# Patient Record
Sex: MALE | Race: WHITE | NOT HISPANIC OR LATINO | Employment: FULL TIME | URBAN - METROPOLITAN AREA
[De-identification: names, ages, dates, MRNs, and addresses within clinical notes are randomized per-mention and may not be internally consistent; named-entity substitution may affect disease eponyms.]

---

## 2023-06-26 PROBLEM — R07.9 CHEST PAIN: Status: ACTIVE | Noted: 2023-06-26

## 2023-07-25 ENCOUNTER — TELEPHONE (OUTPATIENT)
Dept: URGENT CARE | Facility: CLINIC | Age: 63
End: 2023-07-25

## 2023-07-25 DIAGNOSIS — I10 HYPERTENSION, UNSPECIFIED TYPE: Primary | ICD-10-CM

## 2023-07-25 RX ORDER — LISINOPRIL 10 MG/1
10 TABLET ORAL
Qty: 30 TABLET | Refills: 1 | Status: SHIPPED | OUTPATIENT
Start: 2023-07-25

## 2023-07-25 NOTE — TELEPHONE ENCOUNTER
Patient's wife called. He will be out of his antihypertensive medications for approximately 45 days. Refill sent in to get him through until his appointment to establish care.

## 2023-09-11 ENCOUNTER — OFFICE VISIT (OUTPATIENT)
Dept: FAMILY MEDICINE CLINIC | Facility: CLINIC | Age: 63
End: 2023-09-11

## 2023-09-11 VITALS
DIASTOLIC BLOOD PRESSURE: 84 MMHG | RESPIRATION RATE: 20 BRPM | BODY MASS INDEX: 29.48 KG/M2 | SYSTOLIC BLOOD PRESSURE: 160 MMHG | HEIGHT: 62 IN | WEIGHT: 160.2 LBS | TEMPERATURE: 97.9 F | HEART RATE: 55 BPM | OXYGEN SATURATION: 97 %

## 2023-09-11 DIAGNOSIS — F41.9 ANXIETY: ICD-10-CM

## 2023-09-11 DIAGNOSIS — L98.9 SKIN LESIONS: ICD-10-CM

## 2023-09-11 DIAGNOSIS — Z91.09 ENVIRONMENTAL ALLERGIES: ICD-10-CM

## 2023-09-11 DIAGNOSIS — E78.00 ELEVATED LDL CHOLESTEROL LEVEL: ICD-10-CM

## 2023-09-11 DIAGNOSIS — R73.03 PREDIABETES: ICD-10-CM

## 2023-09-11 DIAGNOSIS — Z12.5 SCREENING FOR PROSTATE CANCER: ICD-10-CM

## 2023-09-11 DIAGNOSIS — Z82.49 FAMILY HISTORY OF CARDIAC DISORDER IN FATHER: ICD-10-CM

## 2023-09-11 DIAGNOSIS — R07.9 CHEST PAIN, UNSPECIFIED TYPE: Primary | ICD-10-CM

## 2023-09-11 DIAGNOSIS — I10 HYPERTENSION, UNSPECIFIED TYPE: ICD-10-CM

## 2023-09-11 DIAGNOSIS — Z12.11 SCREENING FOR COLON CANCER: ICD-10-CM

## 2023-09-11 DIAGNOSIS — Z12.2 SCREENING FOR LUNG CANCER: ICD-10-CM

## 2023-09-11 PROBLEM — K40.90 UNILATERAL INGUINAL HERNIA WITHOUT OBSTRUCTION OR GANGRENE: Status: ACTIVE | Noted: 2023-09-11

## 2023-09-11 LAB
BILIRUB BLD-MCNC: NEGATIVE MG/DL
CLARITY, POC: CLEAR
COLOR UR: YELLOW
EXPIRATION DATE: ABNORMAL
GLUCOSE UR STRIP-MCNC: NEGATIVE MG/DL
KETONES UR QL: NEGATIVE
LEUKOCYTE EST, POC: NEGATIVE
Lab: ABNORMAL
NITRITE UR-MCNC: NEGATIVE MG/ML
PH UR: 6 [PH] (ref 5–8)
PROT UR STRIP-MCNC: NEGATIVE MG/DL
RBC # UR STRIP: ABNORMAL /UL
SP GR UR: 1.02 (ref 1–1.03)
UROBILINOGEN UR QL: ABNORMAL

## 2023-09-11 PROCEDURE — 99204 OFFICE O/P NEW MOD 45 MIN: CPT

## 2023-09-11 PROCEDURE — 81003 URINALYSIS AUTO W/O SCOPE: CPT

## 2023-09-11 RX ORDER — BUSPIRONE HYDROCHLORIDE 7.5 MG/1
7.5 TABLET ORAL 2 TIMES DAILY
Qty: 60 TABLET | Refills: 0 | Status: SHIPPED | OUTPATIENT
Start: 2023-09-11

## 2023-09-11 RX ORDER — ASPIRIN 81 MG/1
TABLET ORAL
COMMUNITY
Start: 2023-08-01

## 2023-09-11 RX ORDER — LOSARTAN POTASSIUM 50 MG/1
50 TABLET ORAL DAILY
Qty: 30 TABLET | Refills: 12 | Status: SHIPPED | OUTPATIENT
Start: 2023-09-11

## 2023-09-11 NOTE — PATIENT INSTRUCTIONS
Continue current plan of care as discussed.   Take medication as ordered (if applicable).    Practice good sleep hygiene.  Eat a well balanced diet with fresh fruit and vegetables.    Drink at least 8 bottles of water or equivalent per day.     Limit sweetened beverages, sodas, juices.    Bake, boil, broil or grill your food, avoid eating fried foods.   Exercise at least 150 minutes per week.       Please work on lifestyle habits to decrease your blood pressure.     Purchase blood pressure cuff and begin checking blood pressure at home.  Omron brand works great.  Avoid wrist blood pressure cuffs - they are inaccurate.      Decrease salt intake in your diet: processed foods, frozen foods, restaurant meals.     Decrease or avoid caffeine intake.    Decrease or reduce tobacco and alcohol use (if applicable).     Avoid sea salt, onion salt, celery salt, garlic salt, table salt.     Practice good sleep hygiene.  Eat a well balanced diet with fresh fruit and vegetables.    Drink at least 8 bottles of water or equivalent per day.     Limit sweetened beverages, sodas, juices.    Bake, boil, broil or grill your food, avoid eating fried foods.   Exercise at least 150 minutes per week.

## 2023-09-11 NOTE — PROGRESS NOTES
Office Note     Name: Syed Boykin    : 1960     MRN: [id number]     Chief Complaint  Follow-up ([facility] 23 Chest Pain )    Subjective     History of Present Illness:  Syed Boykin is a 63 y.o. male who presents today for etablishment of care. He was seen at [facility] in  for chest pain. ED evaluation and discharge note below:  +++++  CBC, BMP, TSH, BNP, Mag WNL  - Hgb A1c 6.0  - Trop 10, 9  -Lipid panel   -Chest X-ray: no acute cardiopulmonary disease  - EKG reviewed and showing NSR, no st elevation  -In the ED pt given ASA  -Stress Test completed and pt is low risk for ischemia  -Telemetry  -NPO      Hypertension  -poorly Controlled       BP Readings from Last 1 Encounters:   23 152/72      - Continue lisinopril  - Monitor while admitted      I discussed the patients findings and my recommendations with patient and nursing staff.      Discharge Diagnosis:       Chest pain        Hospital Course  Patient is a 62 y.o. male presented with left sided chest pain x 4-5 weeks. Pt has no pmhx but is a smoker of cigars. Pt describes the chest pain as stabbing over left breast, no radiation, intermittent with nothing making pain better or worse. No complaints of soa, sweating or nausea. Ppt was evaluated in er and admitted for observation. Pts labs, EKG, trop, chest xray were all wnl/negative for acute disease Pt was hypertensive and started on lisinopril here in hospital. Pt vital signs are wnl. Pt had a stress test performed and showing   Left ventricular ejection fraction is hyperdynamic (Calculated EF > 70%). no evidence of ischemia.consistent with a low risk study. Findings consistent with a normal ECG stress test. Will dc home. Discussed plan with pt and pt is agreeable. Pt to start on lisinopril and follow up with pcp.    +++++    Hypertension  This is a new problem. The current episode started more than 1 month ago. The problem has been gradually improving since onset. Associated  symptoms include anxiety and chest pain. Pertinent negatives include no headaches, palpitations, shortness of breath or sweats. Past treatments include nothing. Current antihypertension treatment includes ACE inhibitors. The current treatment provides mild improvement.     The patient is here for establishment of care, management of conditions below, medication refills and preventive care.  He was previously cared for by Patient's previous physician was Dr. Griffith.    Nutrition: eating a variety of foods. Does not eat a lot of vegetables/fruits.  Self Skin Exam: occasionally  Dental care - Dentures.  Ophthalmology care [facility]. Dermatology  -Dr. Parrish (spot removed on back ten plus years ago).     Last tetanus shot was >10 years ago.     Libido:good  Healthy Habits:     Self Skin Exam: monthly  Exercise: Not currently outside of work.   He wears his seatlbelt regularly.   He sleeps approximately five to six hours per night. He does not have sleep apnea.  He does snore.  Caffeine - Soda and coffee (daily)  Tobacco - Cigars daily 2-3 black/milds  Alcohol -Rarely -socially  Marijuana-   Drugs - no    Recent Hospitalizations:  Recently treated at the following:  [facility] .    Age-appropriate Screening Schedule:  Refer to the list below for future screening recommendations based on patient's age, sex and/or medical conditions. Orders for these recommended tests are listed in the plan section. The patient has been provided with a written plan.     BMI FOLLOWUP Never done  COLORECTAL CANCER SCREENING Never done  Pneumococcal Vaccine 0-64(1 - PCV) Never done  TDAP/TD VACCINES(1 - Tdap) Never done  ZOSTER VACCINE(1 of 2) Never done  COVID-19 Vaccine(3 - Pfizer series) due on 05/25/2021  HEPATITIS C SCREENING Never done  ANNUAL PHYSICAL Never done  INFLUENZA VACCINE due on 10/01/2023    Discussion regarding plan of care for medical diagnoses listed below has been completed.   Medical history has  been reviewed.  Anticipatory guidance given and routine screenings recommended.  Genetic screening for cancers and conditions discussed if applicable.  Patient agrees with plan and will follow advice. Patient agrees to return in the near future for annual physical exam.  No Known Allergies      Current Outpatient Medications:     aspirin 81 MG EC tablet, , Disp: , Rfl:     omeprazole (priLOSEC) 40 MG capsule, Take 1 capsule by mouth Daily., Disp: , Rfl:     busPIRone (BUSPAR) 7.5 MG tablet, Take 1 tablet by mouth 2 (Two) Times a Day., Disp: 60 tablet, Rfl: 0    ibuprofen (ADVIL,MOTRIN) 400 MG tablet, Take 1 tablet by mouth Every 6 (Six) Hours As Needed for Mild Pain., Disp: , Rfl:     losartan (COZAAR) 50 MG tablet, Take 1 tablet by mouth Daily., Disp: 30 tablet, Rfl: 12    Past Medical History:   Diagnosis Date    Hypertension july 24,2023       Review of Systems   Constitutional:  Positive for unexpected weight gain (five pound weight gain.). Negative for activity change, appetite change, chills, fatigue, fever and unexpected weight loss.   HENT: Negative.          Dentures     Eyes:  Positive for visual disturbance (glasses).   Respiratory:  Positive for cough (s/p lisinopril). Negative for shortness of breath and wheezing.    Cardiovascular:  Positive for chest pain. Negative for palpitations and leg swelling.   Gastrointestinal:  Positive for GERD. Negative for abdominal distention, abdominal pain, anal bleeding, blood in stool, constipation, diarrhea, nausea and vomiting.   Endocrine: Negative for polydipsia, polyphagia and polyuria.   Genitourinary:  Positive for nocturia (2-3 times). Negative for breast discharge, decreased libido, difficulty urinating, discharge, flank pain, frequency, genital sores, hematuria, penile pain, erectile dysfunction, penile swelling, scrotal swelling and testicular pain.   Musculoskeletal:  Positive for arthralgias.   Skin: Negative.    Allergic/Immunologic: Positive for  "environmental allergies. Negative for food allergies.   Neurological:  Negative for dizziness, seizures, syncope, speech difficulty, weakness, light-headedness, headache and confusion.   Hematological:  Does not bruise/bleed easily.   Psychiatric/Behavioral:  Positive for stress. Negative for decreased concentration, self-injury, sleep disturbance, suicidal ideas and depressed mood. The patient is nervous/anxious.      Social History     Socioeconomic History    Marital status:    Tobacco Use    Smoking status: Some Days     Types: Cigars    Smokeless tobacco: Never   Vaping Use    Vaping Use: Never used   Substance and Sexual Activity    Alcohol use: Not Currently     Comment: rarely drinks-only socially    Drug use: Not Currently    Sexual activity: Yes     Partners: Female       Family History   Problem Relation Age of Onset    Cancer Mother     COPD Mother     Heart disease Father            9/11/2023     9:07 AM   PHQ-2/PHQ-9 Depression Screening   Little Interest or Pleasure in Doing Things 0-->not at all   Feeling Down, Depressed or Hopeless 0-->not at all   PHQ-9: Brief Depression Severity Measure Score 0       Fall Risk Screen:  KATY Fall Risk Assessment has not been completed.      Objective     /84   Pulse 55   Temp 97.9 °F (36.6 °C) (Infrared)   Resp 20   Ht 157.5 cm (62.01\")   Wt 72.7 kg (160 lb 3.2 oz)   SpO2 97%   BMI 29.29 kg/m²     BP Readings from Last 2 Encounters:   09/11/23 160/84   06/27/23 169/78       Wt Readings from Last 2 Encounters:   09/11/23 72.7 kg (160 lb 3.2 oz)   06/26/23 71.9 kg (158 lb 8.2 oz)       BMI is >= 25 and <30. (Overweight) The following options were offered after discussion;: exercise counseling/recommendations and nutrition counseling/recommendations       Physical Exam  Vitals and nursing note reviewed.   Constitutional:       General: He is not in acute distress.     Appearance: Normal appearance. He is well-groomed. He is not ill-appearing, " toxic-appearing or diaphoretic.   HENT:      Head: Normocephalic and atraumatic.   Eyes:      General: Vision grossly intact.   Neck:      Vascular: No carotid bruit.   Cardiovascular:      Rate and Rhythm: Normal rate and regular rhythm.      Heart sounds: Normal heart sounds. No murmur heard.  Pulmonary:      Effort: Pulmonary effort is normal.      Breath sounds: Normal breath sounds and air entry.   Musculoskeletal:      Right lower leg: No edema.      Left lower leg: No edema.   Skin:     General: Skin is warm and dry.      Comments: Scattered brown macular and papular lesions throughout upper torso. H/O excess sun exposure.      Neurological:      Mental Status: He is alert and oriented to person, place, and time. Mental status is at baseline.   Psychiatric:         Attention and Perception: Attention normal.         Mood and Affect: Mood and affect normal.         Behavior: Behavior normal. Behavior is cooperative.         Thought Content: Thought content normal.     Derm Physical Exam  Result Review :     The following data was reviewed by: JOEL Lara on 09/11/2023:  CMP          6/26/2023    10:34 6/27/2023    04:09   CMP   Glucose 165  109    BUN 25  22    Creatinine 0.90  0.80    EGFR 97.2  100.7    Sodium 140  141    Potassium 3.9  3.9    Chloride 105  107    Calcium 9.2  8.9    BUN/Creatinine Ratio 27.8  27.5    Anion Gap 12.0  12.0      CBC w/diff          6/26/2023    10:34 6/27/2023    04:09   CBC w/Diff   WBC 9.30  10.00    RBC 5.41  5.49    Hemoglobin 15.6  15.9    Hematocrit 45.2  47.0    MCV 83.5  85.5    MCH 28.8  29.0    MCHC 34.6  33.9    RDW 12.9  13.1    Platelets 286  283    Neutrophil Rel % 76.7  64.7    Lymphocyte Rel % 15.0  23.5    Monocyte Rel % 7.3  9.2    Eosinophil Rel % 0.5  1.9    Basophil Rel % 0.5  0.7      Lipid Panel          6/26/2023    10:34   Lipid Panel   Total Cholesterol 196    Triglycerides 126    HDL Cholesterol 43    VLDL Cholesterol 23    LDL Cholesterol   "130    LDL/HDL Ratio 2.97      TSH          6/26/2023    10:34   TSH   TSH 1.310      A1C Last 3 Results          6/26/2023    10:34   HGBA1C Last 3 Results   Hemoglobin A1C 6.00      UA          9/11/2023    10:53   Urinalysis   Ketones, UA Negative    Leukocytes, UA Negative         Data reviewed : [facility]       Assessment and Plan     Procedures  Plan    Diagnoses and all orders for this visit:    1. Chest pain, unspecified type (Primary)  Comments:  a/w Anxiety.   x 4 months  \"feeling of being hit in chest\"   Sporadic.   Intermitten.  Orders:  -     Adult Transthoracic Echo Complete W/ Cont if Necessary Per Protocol; Future  -     Ambulatory Referral to Cardiology  -     losartan (COZAAR) 50 MG tablet; Take 1 tablet by mouth Daily.  Dispense: 30 tablet; Refill: 12  -     POC Urinalysis Dipstick, Automated    2. Hypertension, unspecified type  Comments:  Home b/p 116/64 in evening before bedtime.   Elevated today. 175/81.    3. Prediabetes  Comments:  Discussed diet and lifestyle.    4. Elevated LDL cholesterol level  Comments:  Encourage statin medication, limit fried foods, high cholesterol foods, increase exercise.    5. Anxiety  Comments:  Situational with work.  Begin buspirone  Orders:  -     busPIRone (BUSPAR) 7.5 MG tablet; Take 1 tablet by mouth 2 (Two) Times a Day.  Dispense: 60 tablet; Refill: 0    6. Skin lesions  Comments:  right neck.  Recommend patient see Dr. Parrish again.    7. Environmental allergies  Comments:  Encouraged daily cetirizine    8. Family history of cardiac disorder in father  Comments:  Open heart surgery at age 73. (Currently age 81).    9. Screening for prostate cancer  -     PSA SCREENING    10. Screening for colon cancer  Comments:  Approx 8 years ago - 3 polyps.   Patient self pay.    11. Screening for lung cancer  Comments:  Patient self pay. Will discuss at next visit.        Problem List Items Addressed This Visit          Active Problems    Anxiety    Overview     " Situational with work.         Relevant Medications    busPIRone (BUSPAR) 7.5 MG tablet    Chest pain - Primary    Relevant Medications    aspirin 81 MG EC tablet    losartan (COZAAR) 50 MG tablet    Other Relevant Orders    Adult Transthoracic Echo Complete W/ Cont if Necessary Per Protocol    Ambulatory Referral to Cardiology (Completed)    POC Urinalysis Dipstick, Automated (Completed)    Elevated LDL cholesterol level    Environmental allergies    Family history of cardiac disorder in father    Overview     Open heart surgery at age 73. (Currently age 81).         Hypertension    Overview     Home b/p 116/64 in evening before bedtime.   Elevated today. 175/81.         Relevant Medications    losartan (COZAAR) 50 MG tablet    Screening for prostate cancer    Relevant Orders    PSA SCREENING (Completed)    Skin lesions    Overview     right neck.          Other Visit Diagnoses       Prediabetes        Discussed diet and lifestyle.    Screening for colon cancer        Approx 8 years ago - 3 polyps.   Patient self pay.    Screening for lung cancer        Patient self pay. Will discuss at next visit.             Follow Up   Wrapup Tab  Return for Annual physical, Return in two weeks for blood pressure check..   Patient Instructions   Continue current plan of care as discussed.   Take medication as ordered (if applicable).    Practice good sleep hygiene.  Eat a well balanced diet with fresh fruit and vegetables.    Drink at least 8 bottles of water or equivalent per day.     Limit sweetened beverages, sodas, juices.    Bake, boil, broil or grill your food, avoid eating fried foods.   Exercise at least 150 minutes per week.       Please work on lifestyle habits to decrease your blood pressure.     Purchase blood pressure cuff and begin checking blood pressure at home.  Om[employer] brand works great.  Avoid wrist blood pressure cuffs - they are inaccurate.      Decrease salt intake in your diet: processed foods, frozen foods,  restaurant meals.     Decrease or avoid caffeine intake.    Decrease or reduce tobacco and alcohol use (if applicable).     Avoid sea salt, onion salt, celery salt, garlic salt, table salt.     Practice good sleep hygiene.  Eat a well balanced diet with fresh fruit and vegetables.    Drink at least 8 bottles of water or equivalent per day.     Limit sweetened beverages, sodas, juices.    Bake, boil, broil or grill your food, avoid eating fried foods.   Exercise at least 150 minutes per week.        Patient was given instructions and counseling regarding his condition or for health maintenance advice. Please see specific information pulled into the AVS if appropriate.  Hand hygiene was performed during entrance to exam room and following assessment of patient. This document is intended for medical expert use only.     EMR Dragon/Transcription disclaimer:   Much of this encounter note is an electronic transcription/translation of spoken language to printed text. The electronic translation of spoken language may permit erroneous, or at times, nonsensical words or phrases to be inadvertently transcribed.      JOEL Lara, FNP-C  [facility] [address]  [facility]  [address] [place] [address] [address]  [place] [id number] [phone]

## 2023-09-12 LAB — PSA SERPL-MCNC: 4.9 NG/ML (ref 0–4)

## 2023-09-14 DIAGNOSIS — R97.20 ELEVATED PSA: Primary | ICD-10-CM

## 2023-09-16 PROBLEM — E78.00 ELEVATED LDL CHOLESTEROL LEVEL: Status: ACTIVE | Noted: 2023-09-16

## 2023-09-16 PROBLEM — I10 HYPERTENSION: Status: ACTIVE | Noted: 2023-09-16

## 2023-10-07 DIAGNOSIS — F41.9 ANXIETY: ICD-10-CM

## 2023-10-09 RX ORDER — BUSPIRONE HYDROCHLORIDE 7.5 MG/1
7.5 TABLET ORAL 2 TIMES DAILY
Qty: 60 TABLET | Refills: 0 | Status: SHIPPED | OUTPATIENT
Start: 2023-10-09

## 2023-10-13 NOTE — PROGRESS NOTES
Office Note     Name: Syed Boykin    : 1960     MRN: [id number]     Chief Complaint  Hypertension    Subjective     Syed Boykin presents to [facility] for a follow up visit for  hypertension.      History of Present Illness  Patient reports he is feeling the best he has ever felt. He started a new job recently and he states his blood pressure has been running good. He checks his blood pressure at home and has been in the 120's/60's.   Hypertension  This is a chronic problem. Pertinent negatives include no chest pain, palpitations or shortness of breath. There are no associated agents to hypertension. Risk factors for coronary artery disease include male gender. Past treatments include angiotensin blockers.     Patient has recently quit his stressful job and began a new job with less stress leading him to feel better overall.    Home b/p readings : 120/56-60 Averages. He reports no highs or lows.   He reports he could walk a city block without difficulty.   Denies chest pain, palpitations, peripheral edema.   He drinks one tea daily, maybe one Dr. Pepper Zero.  He has cut down on his fried foods, fast foods.   He is working on reducing his sodium intake.     Hyperlipidemia  Last laboratory results revealed an LDL of 130 mg/dL.  Family history of cardiac issues with his father who has had 2 open heart surgeries in the past.     Cardiology Referral -   Has been contacted by cardiology office scheduling an appointment for 2023; however, he will call to reschedule for a date after his new insurance takes effect.       Elevated PSA  Experiencing nocturia approximately 2 times nightly.  Denies being contacted by Dr. Perdomo's office.  He received a call from my office about sending his paperwork to Dr. Perdomo, and he should receive a call.    Prediabetes  Last hemoglobin A1c was 6.0 percent.  Denies family history of diabetes mellitus.        Current Outpatient Medications:      amLODIPine (NORVASC) 2.5 MG tablet, Take 1 tablet by mouth Daily., Disp: 30 tablet, Rfl: 3    aspirin 81 MG EC tablet, , Disp: , Rfl:     atorvastatin (LIPITOR) 40 MG tablet, Take 1 tablet by mouth Daily., Disp: 30 tablet, Rfl: 12    busPIRone (BUSPAR) 7.5 MG tablet, Take 1 tablet by mouth twice daily, Disp: 60 tablet, Rfl: 0    losartan (COZAAR) 50 MG tablet, Take 1 tablet by mouth Daily., Disp: 30 tablet, Rfl: 12    omeprazole (priLOSEC) 40 MG capsule, Take 1 capsule by mouth Daily., Disp: , Rfl:     Not on File    Past Surgical History:   Procedure Laterality Date    COLONOSCOPY  about 5 yrs ago        Family History   Problem Relation Age of Onset    Cancer Mother     COPD Mother     Heart disease Father             9/11/2023     9:07 AM   PHQ-2/PHQ-9 Depression Screening   Little Interest or Pleasure in Doing Things 0-->not at all   Feeling Down, Depressed or Hopeless 0-->not at all   PHQ-9: Brief Depression Severity Measure Score 0       Review of Systems   Constitutional:  Negative for activity change, appetite change, fatigue and fever.   HENT: Negative.     Eyes: Negative.  Negative for visual disturbance.   Respiratory:  Negative for cough and shortness of breath.    Cardiovascular:  Negative for chest pain, palpitations and leg swelling.   Gastrointestinal:  Negative for abdominal pain, blood in stool, constipation, diarrhea, nausea, vomiting and GERD.   Endocrine: Negative.    Genitourinary:  Positive for nocturia. Negative for dysuria and frequency.   Musculoskeletal: Negative.  Negative for arthralgias and myalgias.   Skin: Negative.  Negative for rash and skin lesions.   Allergic/Immunologic: Negative for environmental allergies and food allergies.   Neurological:  Negative for dizziness, seizures, weakness, headache and confusion.   Hematological:  Does not bruise/bleed easily.   Psychiatric/Behavioral:  Negative for self-injury, sleep disturbance, suicidal ideas, depressed mood and stress. The  "patient is not nervous/anxious.        Objective     /84 (BP Location: Right arm, Patient Position: Sitting, Cuff Size: Adult)   Pulse 63   Resp 18   Ht 157.5 cm (62.01\")   Wt 74.4 kg (164 lb)   SpO2 96%   BMI 29.99 kg/m²     BP Readings from Last 2 Encounters:   10/16/23 168/84   09/11/23 160/84       Wt Readings from Last 2 Encounters:   10/16/23 74.4 kg (164 lb)   09/11/23 72.7 kg (160 lb 3.2 oz)       BMI is >= 25 and <30. (Overweight) The following options were offered after discussion;: weight loss educational material (shared in after visit summary) and exercise counseling/recommendations         Physical Exam  Vitals and nursing note reviewed.   Constitutional:       General: He is not in acute distress.     Appearance: Normal appearance. He is well-groomed. He is not ill-appearing, toxic-appearing or diaphoretic.   HENT:      Head: Normocephalic and atraumatic.   Eyes:      General: Vision grossly intact.   Neck:      Vascular: No carotid bruit.   Cardiovascular:      Rate and Rhythm: Normal rate and regular rhythm.      Heart sounds: Normal heart sounds. No murmur heard.  Pulmonary:      Effort: Pulmonary effort is normal.      Breath sounds: Normal breath sounds and air entry.   Musculoskeletal:      Right lower leg: No edema.      Left lower leg: No edema.   Skin:     General: Skin is warm and dry.   Neurological:      Mental Status: He is alert and oriented to person, place, and time. Mental status is at baseline.   Psychiatric:         Attention and Perception: Attention normal.         Mood and Affect: Mood and affect normal.         Behavior: Behavior normal. Behavior is cooperative.         Thought Content: Thought content normal.       Derm Physical Exam  Result Review :{ Labs  Result Review  Imaging  Med Tab  Media     Assessment and Plan      {CC Problem List  Visit Diagnosis  ROS  Review (Popup)  Health Maintenance  Quality  BestPractice  Medications  SmartSets  " SnapShot Encounters  Media   Problems Addressed this Visit          Cardiac and Vasculature    Mixed hyperlipidemia     - Prescription sent for Lipitor 40 mg 1 tablet daily.  - Advised patient to contact me if he begins to experience muscle pain.         Relevant Medications    atorvastatin (LIPITOR) 40 MG tablet    Hypertension     - Advised patient to keep a blood pressure log to take with him to his cardiology appointment.  - Recommended he decrease his daily sodium intake.   - Prescription sent for amlodipine 2.5 mg 1 tablet daily.  - Continue losartan as prescribed.  - Return in 4 weeks for blood pressure follow-up.  Make appointment with , at the [facility]. .         Relevant Medications    amLODIPine (NORVASC) 2.5 MG tablet       Endocrine and Metabolic    Prediabetes - Primary     Last hemoglobin A1c was 6.0.  Current hemoglobin A1c is 5.9.  Recommend reduction of simple carbohydrates, sugars.   Increase exercise, recheck in six months.            Relevant Orders    POC Glycosylated Hemoglobin (Hb A1C)       Family History    Family history of cardiac disorder in father       Genitourinary and Reproductive     Elevated PSA     - Referral placed to [facility] for patient to schedule appointment.          Diagnoses         Codes Comments    Prediabetes    -  Primary ICD-10-CM: R73.03  ICD-9-CM: 790.29     Elevated PSA     ICD-10-CM: R97.20  ICD-9-CM: 790.93 Follow up with urology for elevated level.    Family history of cardiac disorder in father     ICD-10-CM: Z82.49  ICD-9-CM: V17.49 h/o open heart surgery x2.    Mixed hyperlipidemia     ICD-10-CM: E78.2  ICD-9-CM: 272.2     Primary hypertension     ICD-10-CM: I10  ICD-9-CM: 401.9            Procedures    Problem List Items Addressed This Visit          Cardiac and Vasculature    Mixed hyperlipidemia    Current Assessment & Plan     - Prescription sent for Lipitor 40 mg 1 tablet daily.  - Advised patient to contact me if he begins  to experience muscle pain.         Relevant Medications    atorvastatin (LIPITOR) 40 MG tablet    Hypertension    Overview     Home b/p 116/64 in evening before bedtime.   Elevated today. 175/81.         Current Assessment & Plan     - Advised patient to keep a blood pressure log to take with him to his cardiology appointment.  - Recommended he decrease his daily sodium intake.   - Prescription sent for amlodipine 2.5 mg 1 tablet daily.  - Continue losartan as prescribed.  - Return in 4 weeks for blood pressure follow-up.  Make appointment with , at the [facility]. .         Relevant Medications    amLODIPine (NORVASC) 2.5 MG tablet       Endocrine and Metabolic    Prediabetes - Primary    Current Assessment & Plan     Last hemoglobin A1c was 6.0.  Current hemoglobin A1c is 5.9.  Recommend reduction of simple carbohydrates, sugars.   Increase exercise, recheck in six months.            Relevant Orders    POC Glycosylated Hemoglobin (Hb A1C)       Family History    Family history of cardiac disorder in father    Overview     Open heart surgery at age 74. (Currently age 81).            Genitourinary and Reproductive     Elevated PSA    Overview     4.9  Urology referral placed.         Current Assessment & Plan     - Referral placed to [facility] for patient to schedule appointment.               {Instructions Charge Capture  Follow-up Communications     Wrapup Tab  Return in about 4 weeks (around 11/13/2023) for Recheck b/p .     Patient Instructions   Keep appt with Dr. Daniel.    Make appointment with [facility].     Continue current plan of care as discussed.   Take medication as ordered (if applicable).    Practice good sleep hygiene.  Eat a well balanced diet with fresh fruit and vegetables.    Drink at least 8 bottles of water or equivalent per day.     Limit sweetened beverages, sodas, juices.    Bake, boil, broil or grill your food, avoid eating fried foods.   Exercise at least  150 minutes per week.        Patient was given instructions and counseling regarding his condition or for health maintenance advice. Please see specific information pulled into the AVS if appropriate.  Hand hygiene was performed during entrance to exam room and following assessment of patient. This document is intended for medical expert use only.     EMR Dragon/Transcription disclaimer:   Much of this encounter note is an electronic transcription/translation of spoken language to printed text. The electronic translation of spoken language may permit erroneous, or at times, nonsensical words or phrases to be inadvertently transcribed.      JOEL Lara, FNP-C  [address]  [facility]  [address] [place] [address] [address]  [facility] [address] [phone]  Answers submitted by the patient for this visit:  Primary Reason for Visit (Submitted on 10/16/2023)  What is the primary reason for your visit?: High Blood Pressure  Transcribed from ambient dictation for JOEL Lara by Annie Edge.  10/17/23   10:16 EDT    Patient or patient representative verbalized consent to the visit recording.  I have personally performed the services described in this document as transcribed by the above individual, and it is both accurate and complete.

## 2023-10-16 ENCOUNTER — OFFICE VISIT (OUTPATIENT)
Dept: FAMILY MEDICINE CLINIC | Facility: CLINIC | Age: 63
End: 2023-10-16

## 2023-10-16 ENCOUNTER — TELEPHONE (OUTPATIENT)
Dept: FAMILY MEDICINE CLINIC | Facility: CLINIC | Age: 63
End: 2023-10-16

## 2023-10-16 VITALS
HEART RATE: 63 BPM | BODY MASS INDEX: 30.18 KG/M2 | RESPIRATION RATE: 18 BRPM | WEIGHT: 164 LBS | HEIGHT: 62 IN | OXYGEN SATURATION: 96 % | DIASTOLIC BLOOD PRESSURE: 84 MMHG | SYSTOLIC BLOOD PRESSURE: 168 MMHG

## 2023-10-16 DIAGNOSIS — R97.20 ELEVATED PSA: ICD-10-CM

## 2023-10-16 DIAGNOSIS — R73.03 PREDIABETES: Primary | ICD-10-CM

## 2023-10-16 DIAGNOSIS — I10 PRIMARY HYPERTENSION: ICD-10-CM

## 2023-10-16 DIAGNOSIS — Z82.49 FAMILY HISTORY OF CARDIAC DISORDER IN FATHER: ICD-10-CM

## 2023-10-16 DIAGNOSIS — E78.2 MIXED HYPERLIPIDEMIA: ICD-10-CM

## 2023-10-16 PROCEDURE — 99213 OFFICE O/P EST LOW 20 MIN: CPT

## 2023-10-16 RX ORDER — ATORVASTATIN CALCIUM 40 MG/1
40 TABLET, FILM COATED ORAL DAILY
Qty: 30 TABLET | Refills: 12 | Status: SHIPPED | OUTPATIENT
Start: 2023-10-16 | End: 2023-10-16 | Stop reason: SDUPTHER

## 2023-10-16 RX ORDER — ATORVASTATIN CALCIUM 40 MG/1
40 TABLET, FILM COATED ORAL DAILY
Qty: 30 TABLET | Refills: 12 | Status: SHIPPED | OUTPATIENT
Start: 2023-10-16

## 2023-10-16 RX ORDER — AMLODIPINE BESYLATE 2.5 MG/1
2.5 TABLET ORAL DAILY
Qty: 30 TABLET | Refills: 3 | Status: SHIPPED | OUTPATIENT
Start: 2023-10-16

## 2023-10-16 RX ORDER — AMLODIPINE BESYLATE 2.5 MG/1
2.5 TABLET ORAL DAILY
Qty: 30 TABLET | Refills: 3 | Status: SHIPPED | OUTPATIENT
Start: 2023-10-16 | End: 2023-10-16 | Stop reason: SDUPTHER

## 2023-10-16 NOTE — PATIENT INSTRUCTIONS
Keep appt with Dr. Daniel.    Make appointment with [facility].     Continue current plan of care as discussed.   Take medication as ordered (if applicable).    Practice good sleep hygiene.  Eat a well balanced diet with fresh fruit and vegetables.    Drink at least 8 bottles of water or equivalent per day.     Limit sweetened beverages, sodas, juices.    Bake, boil, broil or grill your food, avoid eating fried foods.   Exercise at least 150 minutes per week.

## 2023-10-16 NOTE — Clinical Note
October 16, 2023     Patient: Syed Boykin   YOB: 1960   Date of Visit: 10/16/2023       To Whom It May Concern:    It is my medical opinion that Syed Boykin may return to work in one day.            Sincerely,        JOEL Lara    CC: No Recipients

## 2023-10-16 NOTE — LETTER
October 16, 2023     Patient: Syed Boykin   YOB: 1960   Date of Visit: 10/16/2023       To Whom It May Concern:    It is my medical opinion that Syed Boykin may return to work on 10/17/2023 .           Sincerely,        JOEL Lara    CC: No Recipients

## 2023-10-16 NOTE — TELEPHONE ENCOUNTER
Caller: [facility]    Relationship:     Best call back number:    [phone]     What was the call regarding: PRIOR AUTHORIZATION NEEDED ON AN MRI  FAX # [phone]

## 2023-10-17 NOTE — ASSESSMENT & PLAN NOTE
Last hemoglobin A1c was 6.0.  Current hemoglobin A1c is 5.9.  Recommend reduction of simple carbohydrates, sugars.   Increase exercise, recheck in six months.

## 2023-10-17 NOTE — ASSESSMENT & PLAN NOTE
- Prescription sent for Lipitor 40 mg 1 tablet daily.  - Advised patient to contact me if he begins to experience muscle pain.

## 2023-10-17 NOTE — ASSESSMENT & PLAN NOTE
- Advised patient to keep a blood pressure log to take with him to his cardiology appointment.  - Recommended he decrease his daily sodium intake.   - Prescription sent for amlodipine 2.5 mg 1 tablet daily.  - Continue losartan as prescribed.  - Return in 4 weeks for blood pressure follow-up.  Make appointment with , at the [facility]. .

## 2023-10-30 DIAGNOSIS — F41.9 ANXIETY: ICD-10-CM

## 2023-10-30 RX ORDER — BUSPIRONE HYDROCHLORIDE 7.5 MG/1
7.5 TABLET ORAL 2 TIMES DAILY
Qty: 60 TABLET | Refills: 0 | OUTPATIENT
Start: 2023-10-30

## 2023-11-13 DIAGNOSIS — F41.9 ANXIETY: ICD-10-CM

## 2023-11-13 RX ORDER — BUSPIRONE HYDROCHLORIDE 7.5 MG/1
7.5 TABLET ORAL 2 TIMES DAILY
Qty: 60 TABLET | Refills: 0 | Status: SHIPPED | OUTPATIENT
Start: 2023-11-13

## 2023-11-14 ENCOUNTER — TELEPHONE (OUTPATIENT)
Dept: FAMILY MEDICINE CLINIC | Facility: CLINIC | Age: 63
End: 2023-11-14

## 2023-11-14 RX ORDER — ATORVASTATIN CALCIUM 40 MG/1
40 TABLET, FILM COATED ORAL DAILY
Qty: 30 TABLET | Refills: 12 | Status: CANCELLED | OUTPATIENT
Start: 2023-11-14

## 2023-11-14 RX ORDER — AMLODIPINE BESYLATE 2.5 MG/1
2.5 TABLET ORAL DAILY
Qty: 30 TABLET | Refills: 3 | Status: SHIPPED | OUTPATIENT
Start: 2023-11-14

## 2023-11-14 RX ORDER — ATORVASTATIN CALCIUM 40 MG/1
40 TABLET, FILM COATED ORAL DAILY
Qty: 30 TABLET | Refills: 12 | Status: SHIPPED | OUTPATIENT
Start: 2023-11-14

## 2023-11-14 RX ORDER — AMLODIPINE BESYLATE 2.5 MG/1
2.5 TABLET ORAL DAILY
Qty: 30 TABLET | Refills: 3 | Status: CANCELLED | OUTPATIENT
Start: 2023-11-14

## 2023-11-14 NOTE — TELEPHONE ENCOUNTER
Caller: KATHERINE MCCULLOUGH    Relationship: Emergency Contact    Best call back number:   [phone]    What is the best time to reach you: ANY    Who are you requesting to speak with (clinical staff, provider,  specific staff member): CLINICAL      What was the call regarding: PATIENTS WIFE WANTED TO LET US KNOW THAT PATIENTS BLOOD PRESSURE HAS BEEN PRETTY STABLE. THE HIGHEST IT HAS BEEN /70 AND THAT WAS 3 WEEKS AGO. NORMALLY RUNNING AROUND 102/60.    PATIENTS WIFE ALSO WANTED TO LET US KNOW THAT THE PRESCRIPTIONS WE SENT TO OCTOBER WENT TO A PHARMACY THAT THEY HAVE NEVER USED PATIENT NEEDS THE FOLLOWING PRESCRIPTIONS SENT :    AMLODIPINE 2.5 MG TABLET  ATORVASTATIN 40 MG TABLET   TO THE FOLLOWING PHARMACY:  [facility] [address] [place] [address], [place] - [address] [facility] [address] - [phone] [facility] [phone] FX

## 2023-12-10 DIAGNOSIS — F41.9 ANXIETY: ICD-10-CM

## 2023-12-11 RX ORDER — BUSPIRONE HYDROCHLORIDE 7.5 MG/1
7.5 TABLET ORAL 2 TIMES DAILY
Qty: 60 TABLET | Refills: 0 | Status: SHIPPED | OUTPATIENT
Start: 2023-12-11

## 2024-01-04 DIAGNOSIS — F41.9 ANXIETY: ICD-10-CM

## 2024-01-04 RX ORDER — BUSPIRONE HYDROCHLORIDE 7.5 MG/1
7.5 TABLET ORAL 2 TIMES DAILY
Qty: 60 TABLET | Refills: 0 | Status: SHIPPED | OUTPATIENT
Start: 2024-01-04

## 2024-02-01 DIAGNOSIS — F41.9 ANXIETY: ICD-10-CM

## 2024-02-02 RX ORDER — BUSPIRONE HYDROCHLORIDE 7.5 MG/1
7.5 TABLET ORAL 2 TIMES DAILY
Qty: 60 TABLET | Refills: 0 | Status: SHIPPED | OUTPATIENT
Start: 2024-02-02

## 2024-02-29 DIAGNOSIS — F41.9 ANXIETY: ICD-10-CM

## 2024-02-29 RX ORDER — BUSPIRONE HYDROCHLORIDE 7.5 MG/1
7.5 TABLET ORAL 2 TIMES DAILY
Qty: 60 TABLET | Refills: 0 | Status: SHIPPED | OUTPATIENT
Start: 2024-02-29

## 2024-03-30 DIAGNOSIS — F41.9 ANXIETY: ICD-10-CM

## 2024-04-01 RX ORDER — BUSPIRONE HYDROCHLORIDE 7.5 MG/1
7.5 TABLET ORAL 2 TIMES DAILY
Qty: 60 TABLET | Refills: 0 | Status: SHIPPED | OUTPATIENT
Start: 2024-04-01

## 2024-05-01 DIAGNOSIS — F41.9 ANXIETY: ICD-10-CM

## 2024-05-05 RX ORDER — BUSPIRONE HYDROCHLORIDE 7.5 MG/1
7.5 TABLET ORAL 2 TIMES DAILY
Qty: 60 TABLET | Refills: 0 | Status: SHIPPED | OUTPATIENT
Start: 2024-05-05

## 2024-06-01 DIAGNOSIS — F41.9 ANXIETY: ICD-10-CM

## 2024-06-04 RX ORDER — BUSPIRONE HYDROCHLORIDE 7.5 MG/1
7.5 TABLET ORAL 2 TIMES DAILY
Qty: 60 TABLET | Refills: 0 | Status: SHIPPED | OUTPATIENT
Start: 2024-06-04

## 2024-07-01 DIAGNOSIS — F41.9 ANXIETY: ICD-10-CM

## 2024-07-01 RX ORDER — BUSPIRONE HYDROCHLORIDE 7.5 MG/1
7.5 TABLET ORAL 2 TIMES DAILY
Qty: 60 TABLET | Refills: 0 | Status: SHIPPED | OUTPATIENT
Start: 2024-07-01

## 2024-07-30 DIAGNOSIS — F41.9 ANXIETY: ICD-10-CM

## 2024-07-30 RX ORDER — BUSPIRONE HYDROCHLORIDE 7.5 MG/1
7.5 TABLET ORAL 2 TIMES DAILY
Qty: 60 TABLET | Refills: 0 | Status: SHIPPED | OUTPATIENT
Start: 2024-07-30

## 2024-08-28 DIAGNOSIS — F41.9 ANXIETY: ICD-10-CM

## 2024-08-28 RX ORDER — BUSPIRONE HYDROCHLORIDE 7.5 MG/1
7.5 TABLET ORAL 2 TIMES DAILY
Qty: 60 TABLET | Refills: 0 | Status: SHIPPED | OUTPATIENT
Start: 2024-08-28

## 2024-09-28 DIAGNOSIS — F41.9 ANXIETY: ICD-10-CM

## 2024-09-29 DIAGNOSIS — R07.9 CHEST PAIN, UNSPECIFIED TYPE: ICD-10-CM

## 2024-09-30 RX ORDER — LOSARTAN POTASSIUM 50 MG/1
50 TABLET ORAL DAILY
Qty: 30 TABLET | Refills: 0 | Status: SHIPPED | OUTPATIENT
Start: 2024-09-30

## 2024-09-30 RX ORDER — BUSPIRONE HYDROCHLORIDE 7.5 MG/1
7.5 TABLET ORAL 2 TIMES DAILY
Qty: 60 TABLET | Refills: 0 | OUTPATIENT
Start: 2024-09-30

## 2024-10-02 DIAGNOSIS — F41.9 ANXIETY: ICD-10-CM

## 2024-10-02 NOTE — TELEPHONE ENCOUNTER
Caller: KATHERINE MCCULLOUGH    Relationship: Emergency Contact    Best call back number: [phone]   Requested Prescriptions:   Requested Prescriptions     Pending Prescriptions Disp Refills    busPIRone (BUSPAR) 7.5 MG tablet 60 tablet 0     Sig: Take 1 tablet by mouth 2 (Two) Times a Day.        Pharmacy where request should be sent: [facility] [address] [place] [address]   - [phone]  - [phone] FX     Last office visit with prescribing clinician: 10/16/2023   Last telemedicine visit with prescribing clinician: Visit date not found   Next office visit with prescribing clinician: 10/21/2024     Additional details provided by patient: PATIENT HAS 3 DAYS REMAINING    Does the patient have less than a 3 day supply:  [] Yes  [x] No    Would you like a call back once the refill request has been completed: [] Yes [] No    If the office needs to give you a call back, can they leave a voicemail: [] Yes [] No    Stevie Garrett Rep   10/02/24 13:35 EDT

## 2024-10-03 RX ORDER — BUSPIRONE HYDROCHLORIDE 7.5 MG/1
7.5 TABLET ORAL 2 TIMES DAILY
Qty: 60 TABLET | Refills: 0 | Status: SHIPPED | OUTPATIENT
Start: 2024-10-03

## 2024-10-27 DIAGNOSIS — R07.9 CHEST PAIN, UNSPECIFIED TYPE: ICD-10-CM

## 2024-10-28 DIAGNOSIS — E78.2 MIXED HYPERLIPIDEMIA: ICD-10-CM

## 2024-10-28 DIAGNOSIS — Z13.29 SCREENING FOR THYROID DISORDER: Primary | ICD-10-CM

## 2024-10-28 DIAGNOSIS — Z12.5 SCREENING PSA (PROSTATE SPECIFIC ANTIGEN): ICD-10-CM

## 2024-10-28 DIAGNOSIS — R97.20 ELEVATED PSA: ICD-10-CM

## 2024-10-28 DIAGNOSIS — R73.03 PREDIABETES: ICD-10-CM

## 2024-10-28 DIAGNOSIS — I10 PRIMARY HYPERTENSION: ICD-10-CM

## 2024-10-28 RX ORDER — LOSARTAN POTASSIUM 50 MG/1
50 TABLET ORAL DAILY
Qty: 30 TABLET | Refills: 0 | Status: SHIPPED | OUTPATIENT
Start: 2024-10-28

## 2024-10-29 ENCOUNTER — TELEPHONE (OUTPATIENT)
Dept: FAMILY MEDICINE CLINIC | Facility: CLINIC | Age: 64
End: 2024-10-29

## 2024-10-29 NOTE — TELEPHONE ENCOUNTER
----- Message from Diana Burgos sent at 10/28/2024  8:01 PM EDT -----  Please call the patient and advise I have placed orders for him to have blood work completed at [facility] prior to his office visit in November.    Please also advise no further medications will be filled until he is seen by the provider.  He has not been seen in over a year.

## 2024-10-30 NOTE — TELEPHONE ENCOUNTER
WIFE CALLED REQUESTING ORDERS BE FAXED TO HER WORK, [employer], TO BE DRA[employer]. WIFE CAN FAX RESULTS BACK TO OFFICE WHEN RECEIVED.  FAX:[phone] ATTN:KATHERINE

## 2024-10-30 NOTE — TELEPHONE ENCOUNTER
WIFE CALLED REQUESTING ORDERS BE FAXED TO HER WORK, [employer], TO BE [employer]WN. WIFE CAN FAX RESULTS BACK TO OFFICE WHEN RECEIVED.  FAX:[phone] ATTN:KATHERINE

## 2024-11-06 DIAGNOSIS — F41.9 ANXIETY: ICD-10-CM

## 2024-11-06 RX ORDER — BUSPIRONE HYDROCHLORIDE 7.5 MG/1
7.5 TABLET ORAL 2 TIMES DAILY
Qty: 60 TABLET | Refills: 0 | Status: SHIPPED | OUTPATIENT
Start: 2024-11-06

## 2024-11-27 DIAGNOSIS — R07.9 CHEST PAIN, UNSPECIFIED TYPE: ICD-10-CM

## 2024-11-30 DIAGNOSIS — F41.9 ANXIETY: ICD-10-CM

## 2024-12-02 ENCOUNTER — TELEPHONE (OUTPATIENT)
Dept: FAMILY MEDICINE CLINIC | Facility: CLINIC | Age: 64
End: 2024-12-02

## 2024-12-02 RX ORDER — LOSARTAN POTASSIUM 50 MG/1
50 TABLET ORAL DAILY
Qty: 30 TABLET | Refills: 0 | Status: SHIPPED | OUTPATIENT
Start: 2024-12-02

## 2024-12-02 RX ORDER — BUSPIRONE HYDROCHLORIDE 7.5 MG/1
7.5 TABLET ORAL 2 TIMES DAILY
Qty: 60 TABLET | Refills: 0 | Status: SHIPPED | OUTPATIENT
Start: 2024-12-02

## 2024-12-02 NOTE — TELEPHONE ENCOUNTER
Left detailed message on pt's voicemail with information about orders for blood work and callback number for any questions.

## 2024-12-04 NOTE — PROGRESS NOTES
Office Note     Name: Syed Boykin    : 1960     MRN: [id number]     Chief Complaint  Hyperlipidemia, Hypertension, Prediabetes, and Anxiety    Subjective     History of Present Illness:  Syed Boykin is a 64 y.o. male who presents today for medication refills and management of hypertension anxiety and hyperlipidemia.    Hypertension  This is a recurrent problem. The current episode started more than 1 month ago. The problem has been stable since onset. The problem is controlled. Associated symptoms include anxiety. Pertinent negatives include no chest pain, palpitations or shortness of breath. There are no associated agents to hypertension. Risk factors for coronary artery disease include family history. Past treatments include nothing. The current treatment provides no improvement.   Anxiety  Presents for follow-up visit.  Patient reports no chest pain, confusion, depressed mood, dizziness, nausea, nervous/anxious behavior, palpitations, shortness of breath or suicidal ideas. Symptoms occur most days. The severity of symptoms is mild. The quality of sleep is good. Awakens seldom during the night. Past treatments include nothing.   Hyperlipidemia  This is a recurrent problem. The current episode started more than 1 month ago. The problem is controlled. Recent lipid tests were reviewed and are high. There are no known factors aggravating his hyperlipidemia. Pertinent negatives include no chest pain, myalgias or shortness of breath. The current treatment provides no improvement of lipids.     History of Present Illness  The patient is a 64-year-old male who presents for medication refills. He has a history of hypertension, anxiety, hyperlipidemia, and prediabetes.    He reports an average home blood pressure reading of 110/60, which is monitored approximately 2 to 3 times per week. He is not experiencing any symptoms such as coughing, wheezing, chest pain, palpitations, or leg swelling. He continues his  regimen of daily baby aspirin.    He has been managing his anxiety effectively with buspirone, administered twice daily, and reports no issues.     He experiences nocturia, necessitating urination twice per night.  He reports no urinary issues, decreased stream or force, constipation, or hemorrhoids.    He has made dietary modifications, including reducing his intake of tea, coffee, and sweets.    He has a long-standing spot on his neck, which he has not yet had evaluated. He also had a spot on his back removed 15 years ago.    Supplemental Information  He has undergone a colonoscopy in the past, during which 3 polyps were identified and subsequently removed.    FAMILY HISTORY  His mother has a history of COPD and heart disease. There is no known family history of diabetes or prostate cancer.    MEDICATIONS  Current: losartan, buspirone, aspirin, Prilosec    No Known Allergies      Current Outpatient Medications:   •  aspirin 81 MG EC tablet, , Disp: , Rfl:   •  atorvastatin (LIPITOR) 40 MG tablet, Take 1 tablet by mouth Daily., Disp: 90 tablet, Rfl: 3  •  busPIRone (BUSPAR) 7.5 MG tablet, Take 1 tablet by mouth 2 (Two) Times a Day., Disp: 180 tablet, Rfl: 3  •  losartan (COZAAR) 50 MG tablet, Take 1 tablet by mouth Daily., Disp: 90 tablet, Rfl: 3  •  omeprazole (priLOSEC) 40 MG capsule, Take 1 capsule by mouth Daily., Disp: , Rfl:     Review of Systems   Constitutional:  Negative for activity change, appetite change, fatigue and fever.   HENT: Negative.     Eyes: Negative.  Negative for visual disturbance.   Respiratory:  Negative for cough and shortness of breath.    Cardiovascular:  Negative for chest pain, palpitations and leg swelling.   Gastrointestinal:  Negative for abdominal pain, blood in stool, constipation, diarrhea, nausea, vomiting and GERD.   Endocrine: Negative.    Genitourinary:  Positive for nocturia. Negative for dysuria and frequency.   Musculoskeletal: Negative.  Negative for arthralgias and  "myalgias.   Skin:  Positive for skin lesions. Negative for rash.   Allergic/Immunologic: Negative for environmental allergies and food allergies.   Neurological:  Negative for dizziness, seizures, weakness, headache and confusion.   Hematological:  Does not bruise/bleed easily.   Psychiatric/Behavioral:  Negative for self-injury, sleep disturbance, suicidal ideas, depressed mood and stress. The patient is not nervous/anxious.    All other systems reviewed and are negative.      Social History     Socioeconomic History   • Marital status:    Tobacco Use   • Smoking status: Some Days     Types: Cigars   • Smokeless tobacco: Never   Vaping Use   • Vaping status: Never Used   Substance and Sexual Activity   • Alcohol use: Not Currently     Comment: rarely drinks-only socially   • Drug use: Not Currently   • Sexual activity: Yes     Partners: Female       Family History   Problem Relation Age of Onset   • Lung cancer Mother    • COPD Mother    • Heart disease Father            12/10/2024     3:27 PM   PHQ-2/PHQ-9 Depression Screening   Little interest or pleasure in doing things Not at all   Feeling down, depressed, or hopeless Not at all   How difficult have these problems made it for you to do your work, take care of things at home, or get along with other people? Not difficult at all       Fall Risk Screen:  [facility] not [facility].      Objective     /80 (BP Location: Right arm, Patient Position: Sitting, Cuff Size: Large Adult)   Pulse 58   Temp 97.2 °F (36.2 °C) (Temporal)   Resp 18   Ht 157.5 cm (62.01\")   Wt 71.7 kg (158 lb)   SpO2 97%   BMI 28.89 kg/m²     BP Readings from Last 2 Encounters:   12/10/24 158/80   10/16/23 168/84       Wt Readings from Last 2 Encounters:   12/10/24 71.7 kg (158 lb)   10/16/23 74.4 kg (164 lb)       BMI is >= 25 and <30. (Overweight) The following options were offered after discussion;: exercise counseling/recommendations and nutrition " counseling/recommendations         Physical Exam  Vitals and nursing note reviewed.   Constitutional:       General: He is not in acute distress.     Appearance: Normal appearance. He is well-groomed. He is not ill-appearing.   HENT:      Head: Normocephalic and atraumatic.      Comments: Lesion noted to neck. Brown, irregular shaped, raised. Approx size 9 mm  Eyes:      General: Lids are normal. Vision grossly intact.   Neck:      Vascular: No carotid bruit.   Cardiovascular:      Rate and Rhythm: Normal rate and regular rhythm.      Heart sounds: Normal heart sounds.   Pulmonary:      Effort: Pulmonary effort is normal.      Breath sounds: Normal breath sounds and air entry.   Musculoskeletal:      Right lower leg: No edema.      Left lower leg: No edema.   Skin:     General: Skin is warm and dry.   Neurological:      Mental Status: He is alert and oriented to person, place, and time. Mental status is at baseline.   Psychiatric:         Mood and Affect: Mood normal.         Behavior: Behavior normal. Behavior is cooperative.     Physical Exam   Head   Head comments: Lesion noted to neck. Brown, irregular shaped, raised. Approx size 9 mm        Physical Exam  Arcus senilis noted in the eyes.    Vital Signs  Blood pressure is 158/80.    Result Review :       Results  Laboratory results completed from outlying facility including:  Liver function, sodium, potassium, CMP, thyroid function are normal. Glucose was slightly elevated. A1c is 6.1. PSA is 5.65. White blood cell count, hemoglobin, hematocrit, platelet count are all normal. Total cholesterol is 95, triglycerides are 48, HDL is 40, LDL is 41.    Assessment and Plan     Plan      Assessment & Plan  Mixed hyperlipidemia   His cholesterol levels are well-controlled with his current medication. Total cholesterol is 95, triglycerides are 48, HDL is 40, and LDL is 41. He has arcus senilis on his eyes, which is documented on the chart. He will continue taking  Lipitor.    Orders:  •  atorvastatin (LIPITOR) 40 MG tablet; Take 1 tablet by mouth Daily.    Primary hypertension  His blood pressure is currently uncontrolled at 158/80. He reports an average home reading of 110/60, monitored two to three times a week. He will continue taking losartan 50 mg daily. A 90-day supply of losartan will be sent to [facility].    Orders:  •  losartan (COZAAR) 50 MG tablet; Take 1 tablet by mouth Daily.    Prediabetes  His A1c is 6.1, indicating prediabetes. He reports reducing his intake of tea, coffee, and sweets. He is advised to increase physical activity and maintain a consistent carbohydrate diet, avoiding high-carb foods like white potatoes, white rice, white bread, and white pasta. He should opt for healthier alternatives such as sweet potatoes, green beans, and berries. He is also advised to use aluminum-free deodorant and check the ingredients of any lotions applied to his body. His A1c will be rechecked in 6 months.       Anxiety  He reports no issues with his current medication and feels better since his last visit. He will continue taking buspirone twice a day.  Orders:  •  busPIRone (BUSPAR) 7.5 MG tablet; Take 1 tablet by mouth 2 (Two) Times a Day.    Elevated PSA  His PSA level is elevated at 5.65. He reports no urinary problems, discomfort, or known injury or activity that could cause an elevated PSA. There is no family history of prostate cancer. A referral to urology will be made for further evaluation.       Skin lesion of neck  He has a spot on his neck that needs to be evaluated to rule out melanoma. A referral to a dermatologist will be made for further evaluation.  Orders:  •  Ambulatory Referral to Dermatology    History of colon polyps  The patient has undergone a colonoscopy in the past, during which 3 polyps were identified and subsequently removed.  Recommended patient have a colonoscopy completed but he is reluctant to do so due to cost.  He patient is currently  uninsured.  Advised him strongly consider colonoscopy.         Arcus senilis of both eyes         Uninsured  Patient is uninsured.       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  Reports frequent sun exposure and does not use sunscreen.         Assessment & Plan  1. Hypertension.  His blood pressure is currently uncontrolled at 158/80. He reports an average home reading of 110/60, monitored two to three times a week. He will continue taking losartan 50 mg daily.     2. Anxiety.  He reports no issues with his current medication and feels better since his last visit. He will continue taking buspirone twice a day.    3. Hyperlipidemia.  His cholesterol levels are well-controlled with his current medication. Total cholesterol is 95, triglycerides are 48, HDL is 40, and LDL is 41. He has arcus senilis on his eyes, which is documented on the chart. He will continue taking Lipitor.    4. Prediabetes.  His A1c is 6.1, indicating prediabetes. He reports reducing his intake of tea, coffee, and sweets. He is advised to increase physical activity and maintain a consistent carbohydrate diet, avoiding high-carb foods like white potatoes, white rice, white bread, and white pasta. He should opt for healthier alternatives such as sweet potatoes, green beans, and berries. He is also advised to use aluminum-free deodorant and check the ingredients of any lotions applied to his body. His A1c will be rechecked in 6 months.    5. Elevated PSA.  His PSA level is elevated at 5.65. He reports no urinary problems, discomfort, or known injury or activity that could cause an elevated PSA. There is no family history of prostate cancer. A referral to urology will be made for further evaluation.    6. Skin lesion.  He has a spot on his neck that needs to be evaluated to rule out melanoma. Spot is asymetric, uneven borders, approx 9 mm diameter.  A referral to a dermatologist will be made for further evaluation.    PROCEDURE  The  patient has undergone a colonoscopy in the past, during which 3 polyps were identified and subsequently removed. He also had a spot on his back removed 15 years ago.       Follow Up   Wrapup Tab  No follow-ups on file.     Patient was given instructions and counseling regarding his condition or for health maintenance advice. Please see specific information pulled into the AVS if appropriate.  Hand hygiene was performed during entrance to exam room and following assessment of patient. This document is intended for medical expert use only.       JOEL Lara, FNP-C  [facility] [address]  [facility]  [address] [place] [address] DR SHAILESH TANNER [address] [place] [id number] [phone]    Patient or patient representative verbalized consent for the use of Ambient Listening during the visit with  JOEL Lara for chart documentation. 12/10/2024  18:25 EST

## 2024-12-10 ENCOUNTER — OFFICE VISIT (OUTPATIENT)
Dept: FAMILY MEDICINE CLINIC | Facility: CLINIC | Age: 64
End: 2024-12-10

## 2024-12-10 VITALS
WEIGHT: 158 LBS | TEMPERATURE: 97.2 F | HEART RATE: 58 BPM | HEIGHT: 62 IN | SYSTOLIC BLOOD PRESSURE: 158 MMHG | BODY MASS INDEX: 29.08 KG/M2 | DIASTOLIC BLOOD PRESSURE: 80 MMHG | RESPIRATION RATE: 18 BRPM | OXYGEN SATURATION: 97 %

## 2024-12-10 DIAGNOSIS — R97.20 ELEVATED PSA: ICD-10-CM

## 2024-12-10 DIAGNOSIS — F41.9 ANXIETY: ICD-10-CM

## 2024-12-10 DIAGNOSIS — R73.03 PREDIABETES: ICD-10-CM

## 2024-12-10 DIAGNOSIS — Z86.0100 HISTORY OF COLON POLYPS: ICD-10-CM

## 2024-12-10 DIAGNOSIS — L98.9 SKIN LESION OF NECK: ICD-10-CM

## 2024-12-10 DIAGNOSIS — H18.413 ARCUS SENILIS OF BOTH EYES: ICD-10-CM

## 2024-12-10 DIAGNOSIS — I10 PRIMARY HYPERTENSION: ICD-10-CM

## 2024-12-10 DIAGNOSIS — E78.2 MIXED HYPERLIPIDEMIA: Primary | ICD-10-CM
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
## 2024-12-10 DIAGNOSIS — Z59.71 UNINSURED: ICD-10-CM

## 2024-12-10 RX ORDER — LOSARTAN POTASSIUM 50 MG/1
50 TABLET ORAL DAILY
Qty: 90 TABLET | Refills: 3 | Status: SHIPPED | OUTPATIENT
Start: 2024-12-10

## 2024-12-10 RX ORDER — BUSPIRONE HYDROCHLORIDE 7.5 MG/1
7.5 TABLET ORAL 2 TIMES DAILY
Qty: 180 TABLET | Refills: 3 | Status: SHIPPED | OUTPATIENT
Start: 2024-12-10

## 2024-12-10 RX ORDER — ATORVASTATIN CALCIUM 40 MG/1
40 TABLET, FILM COATED ORAL DAILY
Qty: 90 TABLET | Refills: 3 | Status: SHIPPED | OUTPATIENT
Start: 2024-12-10

## 2024-12-10 NOTE — ASSESSMENT & PLAN NOTE
His blood pressure is currently uncontrolled at 158/80. He reports an average home reading of 110/60, monitored two to three times a week. He will continue taking losartan 50 mg daily. A 90-day supply of losartan will be sent to [facility].    Orders:    losartan (COZAAR) 50 MG tablet; Take 1 tablet by mouth Daily.     4

## 2024-12-10 NOTE — ASSESSMENT & PLAN NOTE
His A1c is 6.1, indicating prediabetes. He reports reducing his intake of tea, coffee, and sweets. He is advised to increase physical activity and maintain a consistent carbohydrate diet, avoiding high-carb foods like white potatoes, white rice, white bread, and white pasta. He should opt for healthier alternatives such as sweet potatoes, green beans, and berries. He is also advised to use aluminum-free deodorant and check the ingredients of any lotions applied to his body. His A1c will be rechecked in 6 months.

## 2024-12-10 NOTE — ASSESSMENT & PLAN NOTE
He reports no issues with his current medication and feels better since his last visit. He will continue taking buspirone twice a day.  Orders:    busPIRone (BUSPAR) 7.5 MG tablet; Take 1 tablet by mouth 2 (Two) Times a Day.

## 2024-12-10 NOTE — ASSESSMENT & PLAN NOTE
The patient has undergone a colonoscopy in the past, during which 3 polyps were identified and subsequently removed.  Recommended patient have a colonoscopy completed but he is reluctant to do so due to cost.  He patient is currently uninsured.  Advised him strongly consider colonoscopy.

## 2024-12-10 NOTE — ASSESSMENT & PLAN NOTE
He has a spot on his neck that needs to be evaluated to rule out melanoma. A referral to a dermatologist will be made for further evaluation.  Orders:    Ambulatory Referral to Dermatology

## 2024-12-10 NOTE — ASSESSMENT & PLAN NOTE
His PSA level is elevated at 5.65. He reports no urinary problems, discomfort, or known injury or activity that could cause an elevated PSA. There is no family history of prostate cancer. A referral to urology will be made for further evaluation.

## 2024-12-10 NOTE — ASSESSMENT & PLAN NOTE
His cholesterol levels are well-controlled with his current medication. Total cholesterol is 95, triglycerides are 48, HDL is 40, and LDL is 41. He has arcus senilis on his eyes, which is documented on the chart. He will continue taking Lipitor.    Orders:    atorvastatin (LIPITOR) 40 MG tablet; Take 1 tablet by mouth Daily.

## 2024-12-16 ENCOUNTER — TELEPHONE (OUTPATIENT)
Dept: FAMILY MEDICINE CLINIC | Facility: CLINIC | Age: 64
End: 2024-12-16

## 2024-12-16 NOTE — TELEPHONE ENCOUNTER
Left detailed message on pt's voicemail with information on orders for blood work with callback number for any questions